# Patient Record
Sex: MALE | Race: WHITE | URBAN - METROPOLITAN AREA
[De-identification: names, ages, dates, MRNs, and addresses within clinical notes are randomized per-mention and may not be internally consistent; named-entity substitution may affect disease eponyms.]

---

## 2022-09-29 ENCOUNTER — OFFICE VISIT (OUTPATIENT)
Dept: URGENT CARE | Facility: CLINIC | Age: 67
End: 2022-09-29

## 2022-09-29 VITALS
HEART RATE: 84 BPM | SYSTOLIC BLOOD PRESSURE: 124 MMHG | BODY MASS INDEX: 30.62 KG/M2 | RESPIRATION RATE: 18 BRPM | DIASTOLIC BLOOD PRESSURE: 84 MMHG | OXYGEN SATURATION: 95 % | WEIGHT: 213.88 LBS | TEMPERATURE: 99 F | HEIGHT: 70 IN

## 2022-09-29 DIAGNOSIS — H10.9 BACTERIAL CONJUNCTIVITIS OF BOTH EYES: ICD-10-CM

## 2022-09-29 DIAGNOSIS — B96.89 ACUTE BACTERIAL SINUSITIS: Primary | ICD-10-CM

## 2022-09-29 DIAGNOSIS — B96.89 BACTERIAL CONJUNCTIVITIS OF BOTH EYES: ICD-10-CM

## 2022-09-29 DIAGNOSIS — J01.90 ACUTE BACTERIAL SINUSITIS: Primary | ICD-10-CM

## 2022-09-29 PROCEDURE — 99214 PR OFFICE/OUTPT VISIT, EST, LEVL IV, 30-39 MIN: ICD-10-PCS | Mod: TIER,S$GLB,, | Performed by: NURSE PRACTITIONER

## 2022-09-29 PROCEDURE — 99214 OFFICE O/P EST MOD 30 MIN: CPT | Mod: TIER,S$GLB,, | Performed by: NURSE PRACTITIONER

## 2022-09-29 RX ORDER — AMLODIPINE BESYLATE 5 MG/1
5 TABLET ORAL DAILY
COMMUNITY

## 2022-09-29 RX ORDER — EZETIMIBE 10 MG/1
10 TABLET ORAL DAILY
COMMUNITY

## 2022-09-29 RX ORDER — DOXYCYCLINE 100 MG/1
100 CAPSULE ORAL 2 TIMES DAILY
Qty: 14 CAPSULE | Refills: 0 | Status: SHIPPED | OUTPATIENT
Start: 2022-09-29 | End: 2022-10-06

## 2022-09-29 RX ORDER — TOBRAMYCIN 3 MG/ML
1 SOLUTION/ DROPS OPHTHALMIC EVERY 6 HOURS
Qty: 5 ML | Refills: 0 | Status: SHIPPED | OUTPATIENT
Start: 2022-09-29 | End: 2022-10-06

## 2022-09-29 NOTE — PROGRESS NOTES
"Subjective:       Patient ID: Rich Le is a 66 y.o. male.    Vitals:  height is 5' 10.08" (1.78 m) and weight is 97 kg (213 lb 13.5 oz). His temporal temperature is 98.6 °F (37 °C). His blood pressure is 124/84 and his pulse is 84. His respiration is 18 and oxygen saturation is 95%.     Chief Complaint: Sinus Problem    Patient c/o sinus pressure,cough w/ greenish phlegm and mild headaches.Patient stated that after they flight a few days later him and his wife started feeling a little under weather.Patient stated that a few days ago he started getting yellowish discharge from both eyes.He say that he is super congestion at night which is causing his dry mouth and now is having soreness in the throat.  Provider note begins below    Patient recently traveled from [place].  Reports being ill for 2 weeks.  With colorful mucous and bilaterally her discharge with ice been crusted shut the morning.  Had a negative COVID test recently.  No fevers.  Reports headaches and some body aches.  Reports some nose bleed        Sinus Problem  This is a new problem. Episode onset: 2 weeks ago. The problem has been gradually worsening since onset. There has been no fever. His pain is at a severity of 2/10. The pain is mild. Associated symptoms include congestion, coughing, headaches, sinus pressure and a sore throat. Pertinent negatives include no ear pain. The treatment provided no relief.     Constitution: Positive for fatigue. Negative for fever.   HENT:  Positive for congestion, nosebleeds, sinus pressure and sore throat. Negative for ear pain.    Cardiovascular:  Negative for chest pain and sob on exertion.   Respiratory:  Positive for cough and sputum production.    Gastrointestinal:  Negative for nausea, vomiting, constipation and diarrhea.   Neurological:  Positive for headaches.     Objective:      Physical Exam   Constitutional: He is oriented to person, place, and time.   HENT:   Head: Normocephalic and atraumatic. "   Ears:   Right Ear: Tympanic membrane, external ear and ear canal normal. Decreased hearing is noted.   Left Ear: Tympanic membrane, external ear and ear canal normal. Decreased hearing is noted.   Nose: Epistaxis is observed.   Mouth/Throat: Oropharynx is clear and moist and mucous membranes are normal.   No uvula      Comments: No uvula  Cardiovascular: Normal rate.   Pulmonary/Chest: Effort normal. No respiratory distress.   Abdominal: Normal appearance.   Neurological: He is alert and oriented to person, place, and time.   Skin: Skin is dry.   Psychiatric: His behavior is normal. Mood normal.       Assessment:       1. Acute bacterial sinusitis    2. Bacterial conjunctivitis of both eyes          Plan:       Saline nasal spray for nose bleed   Antibiotics and eyedrops  Follow-up if symptoms worsen or do not improve.           Acute bacterial sinusitis    Bacterial conjunctivitis of both eyes    Other orders  -     doxycycline (MONODOX) 100 MG capsule; Take 1 capsule (100 mg total) by mouth 2 (two) times daily. for 7 days  Dispense: 14 capsule; Refill: 0  -     tobramycin sulfate 0.3% (TOBREX) 0.3 % ophthalmic solution; Place 1 drop into both eyes every 6 (six) hours. for 7 days  Dispense: 5 mL; Refill: 0